# Patient Record
Sex: FEMALE | Race: WHITE
[De-identification: names, ages, dates, MRNs, and addresses within clinical notes are randomized per-mention and may not be internally consistent; named-entity substitution may affect disease eponyms.]

---

## 2018-12-09 ENCOUNTER — HOSPITAL ENCOUNTER (EMERGENCY)
Dept: HOSPITAL 89 - ER | Age: 23
Discharge: HOME | End: 2018-12-09
Payer: COMMERCIAL

## 2018-12-09 VITALS — DIASTOLIC BLOOD PRESSURE: 98 MMHG | SYSTOLIC BLOOD PRESSURE: 148 MMHG

## 2018-12-09 DIAGNOSIS — S61.011A: ICD-10-CM

## 2018-12-09 DIAGNOSIS — T81.30XA: Primary | ICD-10-CM

## 2018-12-09 PROCEDURE — 99281 EMR DPT VST MAYX REQ PHY/QHP: CPT

## 2018-12-09 NOTE — ER REPORT
History and Physical


Time Seen By MD:  20:44


HPI/ROS


CHIEF COMPLAINT: Wound dehisce





HISTORY OF PRESENT ILLNESS: 23-year-old female presents ambulatory to the ER 


concerns about her right thumb laceration.  She was seen one week ago and had 


her laceration repaired.  She had her sutures out urgent care yesterday.  Her 


wounds is coming open.  She is unable to move her thumb through full range of 


motion.  Patient was advised to follow-up with hand surgery by urgent care.  


Remove her sutures yesterday.  Patient notes no drainage.


Allergies:  


Coded Allergies:  


     No Known Drug Allergies (Unverified , 12/9/18)


Reviewed Nurses Notes:  Yes


Old Medical Records Reviewed:  Yes


Constitutional





Vital Sign - Last 24 Hours








 12/9/18 12/9/18





 20:46 21:00


 


Temp 98.4 


 


Pulse 96 


 


Resp 16 


 


B/P (MAP) 146/100 148/98 (115)


 


Pulse Ox 97 


 


O2 Delivery Room Air 








Physical Exam


   General appearance: Alert no distress.


Respiratory: Chest is non tender, lungs are clear to auscultation.


Cardiac: Regular rate and rhythm 


Extremities: Examination of the right hand reveals a approximated laceration in 


the right thumb web space.  The wound appears to line up.  There is no motion of


the thumb secondary to pain and stiffness.





DIFFERENTIAL DIAGNOSIS: After history and physical exam differential diagnosis 


was considered for wound dehiscence, wound infection,





Medical Decision Making


ED Course/Re-evaluation


ED Course


Patient was admitted to an examination room.  H&P was done.  The differential 


diagnoses was considered.  The wound appears to be having some partial d


ehiscence.  Did discuss the option of applying some Dermabond.  The patient's 


can follow-up with hand surgery since she has no range of motion.  I am unsure 


if there is any tendon lacerations inside.  Patient has stiffness and pain from 


being immobilized for a week.  The wound lays together well approximated.  


Reapplying suture seems inappropriate at this stage.  Patient was dressed and 


advised to follow-up with hand surgery.  She is advised to see hand surgery as 


soon as possible.


Decision to Disposition Date:  Dec 9, 2018


Decision to Disposition Time:  20:52





Depart


Departure


Latest Vital Signs





Vital Signs








  Date Time  Temp Pulse Resp B/P (MAP) Pulse Ox O2 Delivery O2 Flow Rate FiO2


 


12/9/18 21:00    148/98 (115)    


 


12/9/18 20:46 98.4 96 16  97 Room Air  








Impression:  


   Primary Impression:  


   Laceration of right thumb


   Additional Impression:  


   Wound dehiscence


Condition:  Improved


Disposition:  HOME OR SELF-CARE


Patient Instructions:  Wound Dehiscence (ED)





Additional Instructions:  


Follow-up with hand surgeon as soon as possible





Problem Qualifiers








   Primary Impression:  


   Laceration of right thumb


   Encounter type:  subsequent encounter  Damage to nail status:  without damage


    Foreign body presence:  without foreign body  Qualified Codes:  S61.011D - 


   Laceration without foreign body of right thumb without damage to nail, 


   subsequent encounter








CARISSA LUNA DO               Dec 9, 2018 20:54